# Patient Record
Sex: FEMALE | Race: WHITE | Employment: PART TIME | URBAN - NONMETROPOLITAN AREA
[De-identification: names, ages, dates, MRNs, and addresses within clinical notes are randomized per-mention and may not be internally consistent; named-entity substitution may affect disease eponyms.]

---

## 2019-09-24 ENCOUNTER — OFFICE VISIT (OUTPATIENT)
Dept: FAMILY MEDICINE CLINIC | Age: 24
End: 2019-09-24

## 2019-09-24 VITALS
SYSTOLIC BLOOD PRESSURE: 110 MMHG | DIASTOLIC BLOOD PRESSURE: 62 MMHG | OXYGEN SATURATION: 98 % | BODY MASS INDEX: 20.88 KG/M2 | TEMPERATURE: 97.5 F | HEART RATE: 84 BPM | WEIGHT: 133 LBS | HEIGHT: 67 IN

## 2019-09-24 DIAGNOSIS — J06.9 UPPER RESPIRATORY TRACT INFECTION, UNSPECIFIED TYPE: Primary | ICD-10-CM

## 2019-09-24 PROCEDURE — 99213 OFFICE O/P EST LOW 20 MIN: CPT | Performed by: PHYSICIAN ASSISTANT

## 2019-09-24 RX ORDER — METHYLPREDNISOLONE 4 MG/1
TABLET ORAL
Qty: 1 KIT | Refills: 0 | Status: SHIPPED | OUTPATIENT
Start: 2019-09-24 | End: 2019-09-30

## 2019-09-24 RX ORDER — AZITHROMYCIN 250 MG/1
250 TABLET, FILM COATED ORAL SEE ADMIN INSTRUCTIONS
Qty: 6 TABLET | Refills: 0 | Status: SHIPPED | OUTPATIENT
Start: 2019-09-24 | End: 2019-09-29

## 2019-09-24 ASSESSMENT — ENCOUNTER SYMPTOMS
SHORTNESS OF BREATH: 0
COUGH: 1
BACK PAIN: 0
ABDOMINAL PAIN: 0
DIARRHEA: 0
VOMITING: 0
NAUSEA: 0
SORE THROAT: 0
PHOTOPHOBIA: 0

## 2020-01-02 ENCOUNTER — OFFICE VISIT (OUTPATIENT)
Dept: FAMILY MEDICINE CLINIC | Age: 25
End: 2020-01-02

## 2020-01-02 VITALS
HEIGHT: 67 IN | SYSTOLIC BLOOD PRESSURE: 120 MMHG | BODY MASS INDEX: 20.88 KG/M2 | RESPIRATION RATE: 18 BRPM | WEIGHT: 133 LBS | OXYGEN SATURATION: 98 % | DIASTOLIC BLOOD PRESSURE: 62 MMHG | HEART RATE: 99 BPM | TEMPERATURE: 98.7 F

## 2020-01-02 LAB — S PYO AG THROAT QL: NORMAL

## 2020-01-02 PROCEDURE — 87880 STREP A ASSAY W/OPTIC: CPT | Performed by: PHYSICIAN ASSISTANT

## 2020-01-02 PROCEDURE — 99213 OFFICE O/P EST LOW 20 MIN: CPT | Performed by: PHYSICIAN ASSISTANT

## 2020-01-02 RX ORDER — AMOXICILLIN 500 MG/1
500 CAPSULE ORAL 3 TIMES DAILY
Qty: 30 CAPSULE | Refills: 0 | Status: SHIPPED | OUTPATIENT
Start: 2020-01-02 | End: 2020-01-12

## 2020-01-02 RX ORDER — PREDNISONE 20 MG/1
TABLET ORAL
Qty: 10 TABLET | Refills: 0 | Status: SHIPPED
Start: 2020-01-02 | End: 2020-02-25

## 2020-01-02 RX ORDER — BROMPHENIRAMINE MALEATE, PSEUDOEPHEDRINE HYDROCHLORIDE, AND DEXTROMETHORPHAN HYDROBROMIDE 2; 30; 10 MG/5ML; MG/5ML; MG/5ML
5 SYRUP ORAL 4 TIMES DAILY PRN
Qty: 120 ML | Refills: 0 | Status: SHIPPED
Start: 2020-01-02 | End: 2020-02-25

## 2020-01-02 ASSESSMENT — ENCOUNTER SYMPTOMS
APNEA: 0
RHINORRHEA: 0
GASTROINTESTINAL NEGATIVE: 1
TROUBLE SWALLOWING: 0
SORE THROAT: 0
EYES NEGATIVE: 1
STRIDOR: 0
SHORTNESS OF BREATH: 0
CHOKING: 0
SINUS PRESSURE: 1
CHEST TIGHTNESS: 0
COUGH: 1
SINUS PAIN: 0
WHEEZING: 0

## 2020-01-02 NOTE — PROGRESS NOTES
Chief Complaint   Patient presents with    Headache    Pharyngitis       HPI:  Patient presents today for  Sinus congestion and cough since Sunday. No current outpatient medications on file. Allergies   Allergen Reactions    Latex Itching and Swelling         Review of Systems  Review of Systems      VS:  /62   Pulse 99   Temp 98.7 °F (37.1 °C)   Resp 18   Ht 5' 7\" (1.702 m)   Wt 133 lb (60.3 kg)   SpO2 98%   BMI 20.83 kg/m²     Patient's medical, social, and family history reviewed      Physical Exam  Physical Exam      Assessment/Plan:  Tio Buchanan was seen today for headache and pharyngitis. Diagnoses and all orders for this visit:    Pain in throat  -     POCT rapid strep A        Pt. to follow up if PCP if no better 1 week.      Cheo Braga PA-C

## 2020-01-02 NOTE — PROGRESS NOTES
Chief Complaint   Patient presents with    Headache    Pharyngitis       HPI:  Patient presents today for congestion, sore throat headaches for the last few days. Denies fever. Current Outpatient Medications:     amoxicillin (AMOXIL) 500 MG capsule, Take 1 capsule by mouth 3 times daily for 10 days, Disp: 30 capsule, Rfl: 0    predniSONE (DELTASONE) 20 MG tablet, 2 tabs Qam, Disp: 10 tablet, Rfl: 0    brompheniramine-pseudoephedrine-DM 2-30-10 MG/5ML syrup, Take 5 mLs by mouth 4 times daily as needed for Congestion or Cough, Disp: 120 mL, Rfl: 0       Allergies   Allergen Reactions    Latex Itching and Swelling         Review of Systems  Review of Systems   Constitutional: Negative for chills and fever. HENT: Positive for congestion and sinus pressure. Negative for ear discharge, ear pain, postnasal drip, rhinorrhea, sinus pain, sore throat, tinnitus and trouble swallowing. Eyes: Negative. Respiratory: Positive for cough (Nonproductive). Negative for apnea, choking, chest tightness, shortness of breath, wheezing and stridor. Cardiovascular: Negative. Gastrointestinal: Negative. Endocrine: Negative. VS:  /62   Pulse 99   Temp 98.7 °F (37.1 °C)   Resp 18   Ht 5' 7\" (1.702 m)   Wt 133 lb (60.3 kg)   SpO2 98%   BMI 20.83 kg/m²     Patient's medical, social, and family history reviewed      Physical Exam  Physical Exam  Vitals signs and nursing note reviewed. Constitutional:       General: She is not in acute distress. Appearance: She is normal weight. She is not toxic-appearing. HENT:      Head: Normocephalic. Right Ear: Tympanic membrane, ear canal and external ear normal.      Left Ear: Tympanic membrane, ear canal and external ear normal.      Nose: Congestion and rhinorrhea present. Mouth/Throat:      Mouth: Mucous membranes are dry. Pharynx: Oropharyngeal exudate and posterior oropharyngeal erythema present.    Eyes:      Conjunctiva/sclera: Conjunctivae normal.      Pupils: Pupils are equal, round, and reactive to light. Neck:      Musculoskeletal: Normal range of motion and neck supple. No neck rigidity or muscular tenderness. Vascular: No carotid bruit. Cardiovascular:      Rate and Rhythm: Normal rate and regular rhythm. Pulses: Normal pulses. Heart sounds: Normal heart sounds. Pulmonary:      Effort: Pulmonary effort is normal. No respiratory distress. Breath sounds: Normal breath sounds. No stridor. No wheezing, rhonchi or rales. Chest:      Chest wall: No tenderness. Abdominal:      General: Abdomen is flat. Bowel sounds are normal.      Palpations: Abdomen is soft. Lymphadenopathy:      Cervical: No cervical adenopathy. Neurological:      Mental Status: She is alert. Assessment/Plan:  Alyx Dudley was seen today for headache and pharyngitis. Diagnoses and all orders for this visit:    Pain in throat  -     POCT rapid strep A  -     amoxicillin (AMOXIL) 500 MG capsule; Take 1 capsule by mouth 3 times daily for 10 days  -     predniSONE (DELTASONE) 20 MG tablet; 2 tabs Qam  -     brompheniramine-pseudoephedrine-DM 2-30-10 MG/5ML syrup; Take 5 mLs by mouth 4 times daily as needed for Congestion or Cough    Acute URI  -     amoxicillin (AMOXIL) 500 MG capsule; Take 1 capsule by mouth 3 times daily for 10 days  -     predniSONE (DELTASONE) 20 MG tablet; 2 tabs Qam  -     brompheniramine-pseudoephedrine-DM 2-30-10 MG/5ML syrup; Take 5 mLs by mouth 4 times daily as needed for Congestion or Cough        Pt. to follow up if PCP if no better 1 week.      Lai Estrada PA-C

## 2020-06-25 ENCOUNTER — HOSPITAL ENCOUNTER (OUTPATIENT)
Age: 25
Discharge: HOME | End: 2020-06-27
Payer: COMMERCIAL

## 2020-06-25 PROCEDURE — 87147 CULTURE TYPE IMMUNOLOGIC: CPT

## 2020-06-25 PROCEDURE — 87491 CHLMYD TRACH DNA AMP PROBE: CPT

## 2020-06-25 PROCEDURE — 87591 N.GONORRHOEAE DNA AMP PROB: CPT

## 2020-06-25 PROCEDURE — 87070 CULTURE OTHR SPECIMN AEROBIC: CPT

## 2020-06-29 LAB
GENITAL CULTURE, ROUTINE: ABNORMAL
GENITAL CULTURE, ROUTINE: ABNORMAL
ORGANISM: ABNORMAL

## 2020-06-30 LAB
C TRACH DNA GENITAL QL NAA+PROBE: NEGATIVE
N. GONORRHOEAE DNA: NEGATIVE
SOURCE: NORMAL

## 2020-07-10 ENCOUNTER — OFFICE VISIT (OUTPATIENT)
Dept: PRIMARY CARE CLINIC | Age: 25
End: 2020-07-10
Payer: COMMERCIAL

## 2020-07-10 ENCOUNTER — HOSPITAL ENCOUNTER (OUTPATIENT)
Age: 25
Discharge: HOME | End: 2020-07-12
Payer: COMMERCIAL

## 2020-07-10 VITALS
DIASTOLIC BLOOD PRESSURE: 64 MMHG | BODY MASS INDEX: 19.7 KG/M2 | HEART RATE: 76 BPM | TEMPERATURE: 98 F | RESPIRATION RATE: 18 BRPM | HEIGHT: 68 IN | WEIGHT: 130 LBS | OXYGEN SATURATION: 96 % | SYSTOLIC BLOOD PRESSURE: 118 MMHG

## 2020-07-10 LAB
ALBUMIN SERPL-MCNC: 4.2 G/DL (ref 3.5–5.2)
ALP BLD-CCNC: 40 U/L (ref 35–104)
ALT SERPL-CCNC: 10 U/L (ref 0–32)
ANION GAP SERPL CALCULATED.3IONS-SCNC: 14 MMOL/L (ref 7–16)
AST SERPL-CCNC: 18 U/L (ref 0–31)
BILIRUB SERPL-MCNC: 0.5 MG/DL (ref 0–1.2)
BILIRUBIN, POC: NORMAL
BLOOD URINE, POC: NORMAL
BUN BLDV-MCNC: 8 MG/DL (ref 6–20)
CALCIUM SERPL-MCNC: 9.7 MG/DL (ref 8.6–10.2)
CHLORIDE BLD-SCNC: 106 MMOL/L (ref 98–107)
CLARITY, POC: CLEAR
CO2: 21 MMOL/L (ref 22–29)
COLOR, POC: YELLOW
CREAT SERPL-MCNC: 0.9 MG/DL (ref 0.5–1)
GFR AFRICAN AMERICAN: >60
GFR NON-AFRICAN AMERICAN: >60 ML/MIN/1.73
GLUCOSE BLD-MCNC: 95 MG/DL (ref 74–99)
GLUCOSE URINE, POC: NORMAL
HBA1C MFR BLD: 4.9 % (ref 4–5.6)
HCT VFR BLD CALC: 42.4 % (ref 34–48)
HEMOGLOBIN: 14 G/DL (ref 11.5–15.5)
KETONES, POC: NORMAL
LEUKOCYTE EST, POC: NORMAL
MCH RBC QN AUTO: 32 PG (ref 26–35)
MCHC RBC AUTO-ENTMCNC: 33 % (ref 32–34.5)
MCV RBC AUTO: 97 FL (ref 80–99.9)
NITRITE, POC: NORMAL
PDW BLD-RTO: 12.1 FL (ref 11.5–15)
PH, POC: 6
PLATELET # BLD: 159 E9/L (ref 130–450)
PMV BLD AUTO: 11.4 FL (ref 7–12)
POTASSIUM SERPL-SCNC: 3.8 MMOL/L (ref 3.5–5)
PROTEIN, POC: NORMAL
RBC # BLD: 4.37 E12/L (ref 3.5–5.5)
SODIUM BLD-SCNC: 141 MMOL/L (ref 132–146)
SPECIFIC GRAVITY, POC: 1.02
TOTAL PROTEIN: 6.8 G/DL (ref 6.4–8.3)
TSH SERPL DL<=0.05 MIU/L-ACNC: 2.34 UIU/ML (ref 0.27–4.2)
UROBILINOGEN, POC: 0.2
VITAMIN B-12: 215 PG/ML (ref 211–946)
VITAMIN D 25-HYDROXY: 78 NG/ML (ref 30–100)
WBC # BLD: 4.5 E9/L (ref 4.5–11.5)

## 2020-07-10 PROCEDURE — 84443 ASSAY THYROID STIM HORMONE: CPT

## 2020-07-10 PROCEDURE — 80053 COMPREHEN METABOLIC PANEL: CPT

## 2020-07-10 PROCEDURE — 81002 URINALYSIS NONAUTO W/O SCOPE: CPT | Performed by: FAMILY MEDICINE

## 2020-07-10 PROCEDURE — 83036 HEMOGLOBIN GLYCOSYLATED A1C: CPT

## 2020-07-10 PROCEDURE — 36415 COLL VENOUS BLD VENIPUNCTURE: CPT

## 2020-07-10 PROCEDURE — 99203 OFFICE O/P NEW LOW 30 MIN: CPT | Performed by: FAMILY MEDICINE

## 2020-07-10 PROCEDURE — 82607 VITAMIN B-12: CPT

## 2020-07-10 PROCEDURE — 85027 COMPLETE CBC AUTOMATED: CPT

## 2020-07-10 PROCEDURE — 82306 VITAMIN D 25 HYDROXY: CPT

## 2020-07-10 SDOH — HEALTH STABILITY: MENTAL HEALTH: HOW MANY STANDARD DRINKS CONTAINING ALCOHOL DO YOU HAVE ON A TYPICAL DAY?: 3 OR 4

## 2020-07-10 SDOH — HEALTH STABILITY: MENTAL HEALTH: HOW OFTEN DO YOU HAVE A DRINK CONTAINING ALCOHOL?: 2-4 TIMES A MONTH

## 2020-07-10 ASSESSMENT — PATIENT HEALTH QUESTIONNAIRE - PHQ9
SUM OF ALL RESPONSES TO PHQ9 QUESTIONS 1 & 2: 0
SUM OF ALL RESPONSES TO PHQ QUESTIONS 1-9: 0
SUM OF ALL RESPONSES TO PHQ QUESTIONS 1-9: 0
2. FEELING DOWN, DEPRESSED OR HOPELESS: 0
1. LITTLE INTEREST OR PLEASURE IN DOING THINGS: 0

## 2020-07-10 NOTE — PROGRESS NOTES
[address] [facility] presents to the office today for   Chief Complaint   Patient presents with   [address] [facility] patient today    Concerned about diabetes  Numbness and tingling in fingers  Was at [facility] a few months ago for visual floaters but no CT scan she reports  No recent eye exam  Pins and needles in both hands  Recurrent BV/yeast infections - [facility]  Reports labs in [facility] showed \"borderline diabetes\"  Hx of PCOS she reports  Very fatigued  Working at [employer] hx of diabetes in [place]  Nonsmoker  No surgeries  Dad borderline DM2      Review of Systems     /64   Pulse 76   Temp 98 °F (36.7 °C) (Temporal)   Resp 18   Ht 5' 8\" (1.727 m)   Wt 130 lb (59 kg)   LMP 06/11/2020 (Approximate)   SpO2 96%   BMI 19.77 kg/m²   Physical Exam       Current Outpatient Medications:     cephALEXin (KEFLEX) 500 MG capsule, Take 1 capsule by mouth 2 times daily for 7 days, Disp: 14 capsule, Rfl: 0    desogestrel-ethinyl estradiol (KARIVA) 0.15-0.02/0.01 MG (21/5) per tablet, Take 1 tablet by mouth daily, Disp: 1 packet, Rfl: 8     Past Medical History:   Diagnosis Date    Anxiety     Insomnia     PCOS (polycystic ovarian syndrome)        Shekhar Santos was seen today for establish care. Diagnoses and all orders for this visit:    Visual disturbance  -     CBC; Future  -     Comprehensive Metabolic Panel; Future  -     Hemoglobin A1C; Future  -     TSH without Reflex; Future  -     Vitamin B12; Future  -     Vitamin D 25 Hydroxy; Future  -     Song Lux MD, Ophthalmology, [place] ([facility])    Concern about diabetes mellitus without diagnosis  -     POCT Urinalysis no Micro    Numbness and tingling in both hands  -     Hemoglobin A1C; Future  -     TSH without Reflex; Future  -     Vitamin B12; Future  -     Vitamin D 25 Hydroxy; Future    Fatigue, unspecified type  -     CBC; Future  -     TSH without Reflex;  Future  -     Vitamin B12;

## 2020-09-01 ENCOUNTER — OFFICE VISIT (OUTPATIENT)
Dept: PRIMARY CARE CLINIC | Age: 25
End: 2020-09-01
Payer: COMMERCIAL

## 2020-09-01 VITALS
HEART RATE: 71 BPM | BODY MASS INDEX: 19.85 KG/M2 | SYSTOLIC BLOOD PRESSURE: 94 MMHG | WEIGHT: 131 LBS | DIASTOLIC BLOOD PRESSURE: 68 MMHG | OXYGEN SATURATION: 99 % | TEMPERATURE: 98.2 F | HEIGHT: 68 IN

## 2020-09-01 PROCEDURE — 99214 OFFICE O/P EST MOD 30 MIN: CPT | Performed by: INTERNAL MEDICINE

## 2020-09-01 PROCEDURE — 1036F TOBACCO NON-USER: CPT | Performed by: INTERNAL MEDICINE

## 2020-09-01 PROCEDURE — G8420 CALC BMI NORM PARAMETERS: HCPCS | Performed by: INTERNAL MEDICINE

## 2020-09-01 PROCEDURE — G8427 DOCREV CUR MEDS BY ELIG CLIN: HCPCS | Performed by: INTERNAL MEDICINE

## 2020-09-01 RX ORDER — AMITRIPTYLINE HYDROCHLORIDE 25 MG/1
25 TABLET, FILM COATED ORAL NIGHTLY
Qty: 30 TABLET | Refills: 0 | Status: SHIPPED | OUTPATIENT
Start: 2020-09-01

## 2020-09-01 NOTE — PROGRESS NOTES
9/1/20    Celestine Caban, a female of 27 y.o. came to the office    HPI     Celestine Caban presents today as a new patient. She has come in complaining of neck and headaches as well as migraine headaches. This is a longstanding issue for her. She has had headaches since the age of 14. Her neck pain started in March 2020. Prior to the onset of her symptoms she was drinking heavily. She suspects that she may have fallen. Subsequently she has developed left-sided suboccipital pain along with jaw pain. She has been lifting weights over the past month engaging in heavy compound movement such as swats clean and jerks and shoulder presses which tend to exacerbate her symptoms. She also works as a  since this past March that posturally worsens her neck pains. In addition to her neck pain she has bilateral hand numbness and tingling. She does have some accompanying weakness as well. He has had a history of migraine headache disorder since age of 14years old. She was previously prescribed medication but never took it for fear of side effects. She was previously treated for anxiety with a variety of medications. She can only remember the name of Prozac. She did not like the way it made her feel mentally. She states that she gets migraine headaches 3-4 times a week. She does take Excedrin on 1-2 occasions which seemed to help. She denies any caffeine use and overall sleeps well. She has been following up with a chiropractor for all the above which has been improving her symptoms. She states that she is usually symptom-free for 1 week after treatment    She was previously treated for B12 deficiency by her previous provider and now takes 1000 mg a day. Has no significant surgical history    Only history is significant for thyroidectomy in her mother. Her father others and sisters are living and healthy. She has a maternal grandmother who had colon cancer in her 76s.   She does have a family history of coronary artery disease but does not know which family member was afflicted    Is currently employed as a . She drinks alcohol once a week and usually consumes 4 drinks on those occasions. She denies any tobacco or drug use  Surgical History    Allergies   Allergen Reactions    Latex Itching and Swelling       Current Outpatient Medications on File Prior to Visit   Medication Sig Dispense Refill    desogestrel-ethinyl estradiol (KARIVA) 0.15-0.02/0.01 MG (21/5) per tablet Take 1 tablet by mouth daily 1 packet 8     No current facility-administered medications on file prior to visit. Review of Systems   Constitutional: Negative for activity change, appetite change, chills and fatigue. HENT: Negative for hearing loss, Negative for congestion. Respiratory: Negative for chest tightness, shortness of breath and wheezing. Cardiovascular: Negative for leg swelling Negative for chest pain and palpitations. Gastrointestinal: Negative for abdominal pain, Negative for abdominal distention, constipation and diarrhea. Genitourinary: Negative for difficulty urinating, dysuria and frequency. Musculoskeletal: See above  Skin: Negative for color change rash or wound     Neurological: See above  Hematological: Negative for adenopathy. Does not bruise/bleed easily. Psychiatric/Behavioral: Negative for agitation, behavioral problems, confusion and decreased concentration. OBJECTIVE:  BP 94/68   Pulse 71   Temp 98.2 °F (36.8 °C)   Ht 5' 8\" (1.727 m)   Wt 131 lb (59.4 kg)   SpO2 99%   BMI 19.92 kg/m²      Physical Exam   Constitutional: Oriented to person, place, and time. Appears well-developed and well-nourished. No distress. HENT:   Head: Normocephalic and atraumatic. Eyes: Pupils are equal, round, and reactive to light. EOM are normal.   Neck: Neck supple. No JVD present. No tracheal deviation present. No thyromegaly present.    Cardiovascular: Normal rate, regular rhythm, normal heart sounds and intact distal pulses. Exam reveals no gallop and no friction rub. No murmur heard. Pulmonary/Chest: Effort normal and breath sounds normal. No stridor. No respiratory distress. No wheezes or rales. Abdominal: Soft. Bowel sounds are normal. There is no distension nor mass. There is no tenderness. There is no guarding. Musculoskeletal: No edema tenderness or deformity  Neurological: Alert and oriented to person, place, and time. No cranial nerve deficit. Normal muscle tone. Coordination normal.   Skin: Skin is warm and dry. No diaphoresis. No erythema. Psychiatric: Normal mood and affect. Behavior is normal.         ASSESSMENT AND PLAN:    Richard Alonzo was seen today for new patient, migraine and neck pain. Diagnoses and all orders for this visit:    Neck pain  -     amitriptyline (ELAVIL) 25 MG tablet; Take 1 tablet by mouth nightly  -     XR CERVICAL SPINE (4-5 VIEWS); Future    Other migraine without status migrainosus, not intractable        Mena Bojorquez presents today as a new patient. She is complaining of a constellation of symptoms. I do feel that her headaches are likely migraines exacerbated by her cervicogenic factors. I do feel that her arm and hand numbness and tingling may be related to well. I will start her on daily Elavil to help not only with her headaches but with her neuropathic pains. I will perform radiography of the cervical spine. We did discuss possibility of EMG testing which I will defer for now. If her symptoms fail to improve she may benefit from dose escalation versus trial of Imitrex therapy. Another diagnostic consideration is migraine variant. Inform me that she is going to move but I advised her to call back in the next month to assess her the progress of her symptoms    Please note that the above documentation was prepared using voice recognition software.   Every attempt was made to ensure accuracy but there may be spelling, grammatical, and contextual errors. No follow-ups on file.     Doyce Apo, DO

## 2020-09-11 ENCOUNTER — TELEPHONE (OUTPATIENT)
Dept: FAMILY MEDICINE CLINIC | Age: 25
End: 2020-09-11

## 2020-09-11 NOTE — TELEPHONE ENCOUNTER
Patient called our office ([facility]) in regards to her Xray results. Upon further review it looks as if patient called wrong office.  LVM for patient and gave her [address] [facility] number

## 2020-09-22 ENCOUNTER — OFFICE VISIT (OUTPATIENT)
Dept: PHYSICAL MEDICINE AND REHAB | Age: 25
End: 2020-09-22
Payer: COMMERCIAL

## 2020-09-22 VITALS
BODY MASS INDEX: 20 KG/M2 | SYSTOLIC BLOOD PRESSURE: 120 MMHG | TEMPERATURE: 98.3 F | WEIGHT: 132 LBS | HEIGHT: 68 IN | DIASTOLIC BLOOD PRESSURE: 80 MMHG

## 2020-09-22 PROCEDURE — 95886 MUSC TEST DONE W/N TEST COMP: CPT | Performed by: PHYSICAL MEDICINE & REHABILITATION

## 2020-09-22 PROCEDURE — G8420 CALC BMI NORM PARAMETERS: HCPCS | Performed by: PHYSICAL MEDICINE & REHABILITATION

## 2020-09-22 PROCEDURE — G8427 DOCREV CUR MEDS BY ELIG CLIN: HCPCS | Performed by: PHYSICAL MEDICINE & REHABILITATION

## 2020-09-22 PROCEDURE — 95913 NRV CNDJ TEST 13/> STUDIES: CPT | Performed by: PHYSICAL MEDICINE & REHABILITATION

## 2020-09-22 PROCEDURE — 1036F TOBACCO NON-USER: CPT | Performed by: PHYSICAL MEDICINE & REHABILITATION

## 2020-09-22 PROCEDURE — 99201 PR OFFICE OUTPATIENT NEW 10 MINUTES: CPT | Performed by: PHYSICAL MEDICINE & REHABILITATION

## 2020-09-22 NOTE — PROGRESS NOTES
[address] [facility]  Electrodiagnostic Laboratory  *Accredited by the [facility] with exemplary status  [address]  Phone: [phone]  Fax: [phone]    Referring Provider: Oriana Cooper DO  Primary Care Physician: Allison Kwong DO  Patient Name: Selma Busby  Patient YOB: 1995  Gender: female  BMI: Body mass index is 20.07 kg/m². Blood pressure 120/80, temperature 98.3 °F (36.8 °C), height 5' 8\" (1.727 m), weight 132 lb (59.9 kg). 9/22/2020    Description of clinical problem:   Chief Complaint   Patient presents with    Hand Numbness     B/L numbness and tingling in hands    Neck pain with radiation to both upper extremities to the hands. Associated numbness and tingling in both hands that comes and goes. She also endorses intermittent weakness. Pain: Yes   ; Numbness/tingling:  Yes; Weakness:  Yes       Brief physical exam:   Sensory deficit: No; Weakness: No; Atrophy:  No; Reflex abnormality: No    Study Limitations:  None    Motor NCS Upper      Nerve / Sites Onset Amp. 1-2 Dist [place] Temp. Amp. 1-2 d Lat. ms mV cm m/s °C % ms   R MEDIAN - APB      Wrist 2.24 14.4 8  32 100 2.24      Elbow 5.78 12.9 19 53.6 32 89.6 3.54   L MEDIAN - APB      Wrist 2.86 11.9 8  32 100 2.86      Elbow 6.51 10.7 20 54.9 32 89.9 3.65   R ULNAR - ADM      Wrist 2.34 11.5 8  32 100 2.34      B. Elbow 5.00 10.9 17 64.0 32 95 2.66      A. Elbow 6.61 10.8 10 61.9 32 94.2 1.61   L ULNAR - ADM      Wrist 2.19 10.1 8  32 100 2.19      B. Elbow 5.21 10.1 17 56.3 32 101 3.02      A. Elbow 6.98 10.1 10 56.5 32 100 1.77               Sensory NCS Upper      Nerve / Sites Onset Peak PP Amp Dist [place] Temp. Amp. 1-2    ms ms µV cm m/s °C %   R MEDIAN - Dig II      Mid Plam 1.15 1.77 54.5 7 61.1 32 100      Wrist 2.29 3.18 47.0 14 61.1 32 86.2   L MEDIAN - Dig II      Mid Plam 1.09 1.82 76.8 7 64.0 32 100      Wrist 2.29 3.18 63.1 14 61.1 32 67.2   R ULNAR - Dig V      Wrist 2.19 2.97 88.5 14 64.0 32 100   L ULNAR - Dig V      Wrist 2.08 2.92 62.4 14 67.2 32.2 100   R RADIAL - Thumb      Forearm 1.41 2.03 24.4 10 71.1 32 100   L RADIAL - Thumb      Forearm 1.56 1.88 28.4 10 64.0 32.2 100                   Combined Sensory Index (CSI)      Nerve / Sites Rec. Site Peak Lat NP Amp PP Amp Segments Distance Peak Diff Ref. Temp. ms µV µV  cm ms ms °C   R HAND - CSI      Radial Dig I 2.60 11.7 21.7 Radial - Dig I 10   32      Median Dig I 2.29 29.4 51.7 Median - Radial 10 -0.31  32      Ulnar Dig IV 2.71 35.4 65.3 Ulnar - Dig IV 14   32      Median Dig IV 2.55 46.1 67.9 Median - Median 14 -0.16  32      Ulnar palmar Wrist 1.20 43.5 40.8 Ulnar palmar - Wrist 8   32      Median palmar Wrist 1.56 240.8 267.9 Median palmar - Ulnar palmar 8 0.36  32      CSI     CSI  -0.11 ?0.90    L HAND - CSI      Radial Dig I 2.14 17.0 18.4 Radial - Dig I 10   32      Median Dig I 2.50 38.9 55.3 Median - Radial 10 0.36  32      Ulnar Dig IV 2.76 33.1 64.0 Ulnar - Dig IV 14   32      Median Dig IV 2.81 19.8 36.3 Median - Median 14 0.05  32      Ulnar palmar Wrist 1.35 266.9 318.0 Ulnar palmar - Wrist 8   32      Median palmar Wrist 1.20 74.4 70.2 Median palmar - Ulnar palmar 8 -0.15  32      CSI     CSI  0.26 ?0.90            F  Wave      Nerve Fmin % F    ms %   R MEDIAN 23.85 0   R ULNAR 24.79 0   L MEDIAN 24.84 0   L ULNAR 24.84 0         EMG Summary Table     Spontaneous MUAP Recruitment    IA Fib PSW Fasc H.F. Amp Dur. PPP Pattern   R. DELTOID N None None None None N N N N   R. BICEPS N None None None None 1+ 1+ N N   R. TRICEPS N None None None None N N N N   R. BRACHIORADIALIS N None None None None 1+ N N N   R. PRON TERES N None None None None 1+ 1+ N N   R. FIRST D INTEROSS N None None None None N N N N   R. ABD POLL BREVIS N None None None None N N N N   R. CERV PSP (M) N None None None None       R. CERV PSP (L) N None None None None       L.  DELTOID N None None None None N N N N   L. BICEPS N None None testing. Clinical correlation is highly advised. 2. There is no electrodiagnostic evidence of any other peripheral nerve mononeuropathy or plexopathy in the bilateral upper extremities. Cannot evaluate for pure sensory radiculopathy or small fiber neuropathy by electrodiagnostic technique. Previous Study: No prior study       Follow up EMG can be completed in the future if clinically indicated. Technologist: Meagan Victor    Physician:  Chase Lee MD  Physical Medicine and Rehabilitation    Nerve conduction studies and electromyography were performed according to our laboratory policies and procedures which can be provided upon request. All abnormal values are identified in the table.  Laboratory normal values can also be provided upon request.       Cc: Donnel Councilman, DO Jeremiah Donalds, DO

## 2020-10-02 ENCOUNTER — TELEPHONE (OUTPATIENT)
Dept: PRIMARY CARE CLINIC | Age: 25
End: 2020-10-02

## 2020-10-02 NOTE — TELEPHONE ENCOUNTER
Pt called wanting results of EMG and stated that the when she had the EMG performed, staff mentioned something about her having an MRI. Pt will be going out of [place] and is hoping to have this completed before she leaves next week. Please review and advise.

## 2020-10-22 ENCOUNTER — TELEPHONE (OUTPATIENT)
Dept: ADMINISTRATIVE | Age: 25
End: 2020-10-22

## 2020-10-22 NOTE — TELEPHONE ENCOUNTER
Patient called in stating that she has moved and would like her physical therapy referral faxed to [facility] at [phone].  Please call patient to let her know it has been faxed [phone]
